# Patient Record
Sex: FEMALE | Race: OTHER | HISPANIC OR LATINO | URBAN - METROPOLITAN AREA
[De-identification: names, ages, dates, MRNs, and addresses within clinical notes are randomized per-mention and may not be internally consistent; named-entity substitution may affect disease eponyms.]

---

## 2022-01-07 ENCOUNTER — EMERGENCY (EMERGENCY)
Facility: HOSPITAL | Age: 37
LOS: 1 days | Discharge: HOME | End: 2022-01-07
Attending: STUDENT IN AN ORGANIZED HEALTH CARE EDUCATION/TRAINING PROGRAM
Payer: MEDICAID

## 2022-01-07 VITALS
DIASTOLIC BLOOD PRESSURE: 78 MMHG | TEMPERATURE: 98 F | SYSTOLIC BLOOD PRESSURE: 113 MMHG | OXYGEN SATURATION: 98 % | HEART RATE: 76 BPM | RESPIRATION RATE: 16 BRPM | WEIGHT: 191.8 LBS

## 2022-01-07 LAB
ALBUMIN SERPL ELPH-MCNC: 3.4 G/DL — LOW (ref 3.5–5)
ALP SERPL-CCNC: 103 U/L — SIGNIFICANT CHANGE UP (ref 40–120)
ALT FLD-CCNC: 15 U/L DA — SIGNIFICANT CHANGE UP (ref 10–60)
ANION GAP SERPL CALC-SCNC: 4 MMOL/L — LOW (ref 5–17)
AST SERPL-CCNC: 14 U/L — SIGNIFICANT CHANGE UP (ref 10–40)
BASOPHILS # BLD AUTO: 0.03 K/UL — SIGNIFICANT CHANGE UP (ref 0–0.2)
BASOPHILS NFR BLD AUTO: 0.4 % — SIGNIFICANT CHANGE UP (ref 0–2)
BILIRUB SERPL-MCNC: 0.3 MG/DL — SIGNIFICANT CHANGE UP (ref 0.2–1.2)
BUN SERPL-MCNC: 11 MG/DL — SIGNIFICANT CHANGE UP (ref 7–18)
CALCIUM SERPL-MCNC: 9 MG/DL — SIGNIFICANT CHANGE UP (ref 8.4–10.5)
CHLORIDE SERPL-SCNC: 108 MMOL/L — SIGNIFICANT CHANGE UP (ref 96–108)
CO2 SERPL-SCNC: 26 MMOL/L — SIGNIFICANT CHANGE UP (ref 22–31)
CREAT SERPL-MCNC: 0.58 MG/DL — SIGNIFICANT CHANGE UP (ref 0.5–1.3)
EOSINOPHIL # BLD AUTO: 0.14 K/UL — SIGNIFICANT CHANGE UP (ref 0–0.5)
EOSINOPHIL NFR BLD AUTO: 1.7 % — SIGNIFICANT CHANGE UP (ref 0–6)
GLUCOSE SERPL-MCNC: 90 MG/DL — SIGNIFICANT CHANGE UP (ref 70–99)
HCT VFR BLD CALC: 39.1 % — SIGNIFICANT CHANGE UP (ref 34.5–45)
HGB BLD-MCNC: 12.8 G/DL — SIGNIFICANT CHANGE UP (ref 11.5–15.5)
IMM GRANULOCYTES NFR BLD AUTO: 0.4 % — SIGNIFICANT CHANGE UP (ref 0–1.5)
LYMPHOCYTES # BLD AUTO: 1.77 K/UL — SIGNIFICANT CHANGE UP (ref 1–3.3)
LYMPHOCYTES # BLD AUTO: 21.8 % — SIGNIFICANT CHANGE UP (ref 13–44)
MAGNESIUM SERPL-MCNC: 2.4 MG/DL — SIGNIFICANT CHANGE UP (ref 1.6–2.6)
MCHC RBC-ENTMCNC: 29.1 PG — SIGNIFICANT CHANGE UP (ref 27–34)
MCHC RBC-ENTMCNC: 32.7 GM/DL — SIGNIFICANT CHANGE UP (ref 32–36)
MCV RBC AUTO: 88.9 FL — SIGNIFICANT CHANGE UP (ref 80–100)
MONOCYTES # BLD AUTO: 0.46 K/UL — SIGNIFICANT CHANGE UP (ref 0–0.9)
MONOCYTES NFR BLD AUTO: 5.7 % — SIGNIFICANT CHANGE UP (ref 2–14)
NEUTROPHILS # BLD AUTO: 5.7 K/UL — SIGNIFICANT CHANGE UP (ref 1.8–7.4)
NEUTROPHILS NFR BLD AUTO: 70 % — SIGNIFICANT CHANGE UP (ref 43–77)
NRBC # BLD: 0 /100 WBCS — SIGNIFICANT CHANGE UP (ref 0–0)
PLATELET # BLD AUTO: 296 K/UL — SIGNIFICANT CHANGE UP (ref 150–400)
POTASSIUM SERPL-MCNC: 4.2 MMOL/L — SIGNIFICANT CHANGE UP (ref 3.5–5.3)
POTASSIUM SERPL-SCNC: 4.2 MMOL/L — SIGNIFICANT CHANGE UP (ref 3.5–5.3)
PROT SERPL-MCNC: 7.7 G/DL — SIGNIFICANT CHANGE UP (ref 6–8.3)
RBC # BLD: 4.4 M/UL — SIGNIFICANT CHANGE UP (ref 3.8–5.2)
RBC # FLD: 14.1 % — SIGNIFICANT CHANGE UP (ref 10.3–14.5)
SODIUM SERPL-SCNC: 138 MMOL/L — SIGNIFICANT CHANGE UP (ref 135–145)
WBC # BLD: 8.13 K/UL — SIGNIFICANT CHANGE UP (ref 3.8–10.5)
WBC # FLD AUTO: 8.13 K/UL — SIGNIFICANT CHANGE UP (ref 3.8–10.5)

## 2022-01-07 PROCEDURE — 99284 EMERGENCY DEPT VISIT MOD MDM: CPT

## 2022-01-07 PROCEDURE — 36415 COLL VENOUS BLD VENIPUNCTURE: CPT

## 2022-01-07 PROCEDURE — 96374 THER/PROPH/DIAG INJ IV PUSH: CPT

## 2022-01-07 PROCEDURE — 83735 ASSAY OF MAGNESIUM: CPT

## 2022-01-07 PROCEDURE — 85025 COMPLETE CBC W/AUTO DIFF WBC: CPT

## 2022-01-07 PROCEDURE — 80053 COMPREHEN METABOLIC PANEL: CPT

## 2022-01-07 PROCEDURE — 99284 EMERGENCY DEPT VISIT MOD MDM: CPT | Mod: 25

## 2022-01-07 PROCEDURE — 96361 HYDRATE IV INFUSION ADD-ON: CPT

## 2022-01-07 RX ORDER — ACETAMINOPHEN 500 MG
975 TABLET ORAL ONCE
Refills: 0 | Status: COMPLETED | OUTPATIENT
Start: 2022-01-07 | End: 2022-01-07

## 2022-01-07 RX ORDER — KETOROLAC TROMETHAMINE 30 MG/ML
15 SYRINGE (ML) INJECTION ONCE
Refills: 0 | Status: DISCONTINUED | OUTPATIENT
Start: 2022-01-07 | End: 2022-01-07

## 2022-01-07 RX ORDER — SODIUM CHLORIDE 9 MG/ML
1000 INJECTION INTRAMUSCULAR; INTRAVENOUS; SUBCUTANEOUS ONCE
Refills: 0 | Status: COMPLETED | OUTPATIENT
Start: 2022-01-07 | End: 2022-01-07

## 2022-01-07 RX ADMIN — Medication 15 MILLIGRAM(S): at 14:02

## 2022-01-07 RX ADMIN — Medication 975 MILLIGRAM(S): at 14:02

## 2022-01-07 RX ADMIN — SODIUM CHLORIDE 1000 MILLILITER(S): 9 INJECTION INTRAMUSCULAR; INTRAVENOUS; SUBCUTANEOUS at 15:02

## 2022-01-07 RX ADMIN — Medication 975 MILLIGRAM(S): at 15:02

## 2022-01-07 RX ADMIN — SODIUM CHLORIDE 2000 MILLILITER(S): 9 INJECTION INTRAMUSCULAR; INTRAVENOUS; SUBCUTANEOUS at 14:02

## 2022-01-07 RX ADMIN — Medication 15 MILLIGRAM(S): at 15:02

## 2022-01-07 NOTE — ED PROVIDER NOTE - NSFOLLOWUPINSTRUCTIONS_ED_ALL_ED_FT
You were seen in the emergency department for: buttock pain  Your labs were normal.  Your pain is likely muscular, but it might be a nerve pain.    You may be contacted by the Emergency Department Referrals Coordinator to set up your follow-up appointment within 24-48 hours of your discharge, Monday to Friday. We recommend you follow up with: your primary care doctor    Please return to the Emergency Department if you experience any of the following symptoms:   - Shortness of breath or trouble breathing  - Pressure, pain or tightness in the chest  - Face drooping, arm weakness or speech difficulty  - Persistence of severe vomiting  - Head injury or loss of consciousness  - Nonstop bleeding or an open wound    (1) Follow up with your primary care physician within the next 24-48 hours as discussed. In addition, we did not find evidence of a life threatening illness on your testing here today, but listed below are the specialists that will be necessary to see as an outpatient to continue the workup.  Please call the numbers listed below or [phone] to set up the necessary appointments.  (2) Take Tylenol (up to 1000mg or 1 g)  and/or Motrin (up to 600mg) up to every 6 hours as needed for pain.   (3) If you had an IV (intravenous) line placed, it was removed. Sometimes, after IV removal, that area can be tender for a few days; if it develops redness and swelling, those could be signs of infection; in which case, return to the Emergency Department for assessment.  (4) Please continue taking all of your home medications as directed. Lo vieron en el departamento de emergencias por: dolor en los glúteos  Tus laboratorios fueron normales.  Es probable que santana dolor sea muscular, lisa podría ser un dolor nervioso.    El Coordinador de Referencias del Departamento de Emergencias puede comunicarse con usted para programar santana toro de seguimiento dentro de las 24 a 48 horas posteriores a santana chris, de lunes a viernes. Le recomendamos que radha un seguimiento con: santana médico de atención primaria    Regrese al Departamento de Emergencias si experimenta alguno de los siguientes síntomas:  - Dificultad para respirar o dificultad para respirar  - Presión, dolor u opresión en el pecho  - Susan caída, debilidad en los brazos o dificultad para hablar  - Persistencia de vómitos severos  - Lesión en la rigo o pérdida del conocimiento  - Sangrado continuo o shannon herida abierta    (1) Radha un seguimiento con santana médico de atención primaria dentro de las próximas 24 a 48 horas, según lo discutido. Además, no encontramos evidencia de shannon enfermedad potencialmente mortal en deolnte pruebas aquí hoy, lisa a continuación se enumeran los especialistas que será necesario jorge a louisa paciente ambulatorio para continuar con el estudio. Llame a los números que se indican a continuación o al [phone] para programar las citas necesarias.  (2) [place] Tylenol (hasta 1000 mg o 1 g) y/o Motrin (hasta 600 mg) hasta cada 6 horas según sea necesario para el dolor.  (3) Si le colocaron shannon línea IV (intravenosa), se la quitaron. A veces, después de la extracción de la vía intravenosa, gualberto área puede estar sensible sharan unos días; si desarrolla enrojecimiento e hinchazón, podrían ser signos de infección; en cuyo corinne, regrese al Departamento de Emergencias para santana evaluación.  (4) Continúe tomando todos delonte medicamentos en el hogar según las indicaciones.    You were seen in the emergency department for: buttock pain  Your labs were normal.  Your pain is likely muscular, but it might be a nerve pain.    You may be contacted by the Emergency Department Referrals Coordinator to set up your follow-up appointment within 24-48 hours of your discharge, Monday to Friday. We recommend you follow up with: your primary care doctor    Please return to the Emergency Department if you experience any of the following symptoms:   - Shortness of breath or trouble breathing  - Pressure, pain or tightness in the chest  - Face drooping, arm weakness or speech difficulty  - Persistence of severe vomiting  - Head injury or loss of consciousness  - Nonstop bleeding or an open wound    (1) Follow up with your primary care physician within the next 24-48 hours as discussed. In addition, we did not find evidence of a life threatening illness on your testing here today, but listed below are the specialists that will be necessary to see as an outpatient to continue the workup.  Please call the numbers listed below or [phone] to set up the necessary appointments.  (2) Take Tylenol (up to 1000mg or 1 g)  and/or Motrin (up to 600mg) up to every 6 hours as needed for pain.   (3) If you had an IV (intravenous) line placed, it was removed. Sometimes, after IV removal, that area can be tender for a few days; if it develops redness and swelling, those could be signs of infection; in which case, return to the Emergency Department for assessment.  (4) Please continue taking all of your home medications as directed.

## 2022-01-07 NOTE — ED PROVIDER NOTE - PATIENT PORTAL LINK FT
You can access the FollowMyHealth Patient Portal offered by [facility] by registering at the following website: [email or web address]. By joining [employer]’s FollowMyHealth portal, you will also be able to view your health information using other applications (apps) compatible with our system.

## 2022-01-07 NOTE — ED PROVIDER NOTE - CLINICAL SUMMARY MEDICAL DECISION MAKING FREE TEXT BOX
36-year-old female no medical hx presenting with L buttock/thigh pain x 3 days. Will check basic labs given complaint of intermittent tingling and provide analgesia. Anticipate discharge with PMD followup.

## 2022-01-07 NOTE — ED PROVIDER NOTE - OBJECTIVE STATEMENT
36-year-old female no medical hx presenting with L buttock/thigh pain x 3 days. No inciting factors, no trauma. Notes she's had similar symptoms for months episodically but this episode is worse. Has also had intermittent tingling/burning over her whole body for the past year or so. Denies any other symptoms.

## 2022-01-07 NOTE — ED PROVIDER NOTE - MUSCULOSKELETAL, MLM
Spine appears normal, range of motion is not limited. Tenderness to light palpation over L buttock/thigh.

## 2022-01-07 NOTE — ED PROVIDER NOTE - DISCHARGE DATE
[facility]  Office: [phone]  Ericepi Nolberto Blood DO, Jenny Rose MD, Jay Adames MD, Willard Landa MD, Alice Reid MD, Ruddy Graham MD, Cole Perea MD, Pete Kim MD, Sabrina Francois MD, Kayla Esteves MD, Verenice Jani DO, Azeem Craig MD, Arian Madsen MD, Mildred Meneses DO, Amy Wharton MD,  Vincenzo Ramsey MD, Venancio Capone MD, [facility], [place] CNP, Johnson Ferrelle CNP, Etienne Rogers CNS, Aiden Root CNP, Kirstie Britt CNP, Baron Lesch CNP, Palmira Gallego CNP, Iveth Vang CNP, Alcira Nayak PA-C, [place] Parks CNP, Ernesto Santamaria CNP, Kevin Thorne CNP, Natalie Highlands CNP, Imani Mcintosh CNP, [place] [address]      Discharge Summary     Patient ID: Hans Walters  :     MRN: [id number]     ACCOUNT:  [de-identified]   Patient Location :   Patient's PCP: Vicente Krishnamurthy MD  Admit Date: 11/15/2021   Discharge Date: 2021     Length of Stay: 3  Code Status:  Prior  Admitting Physician: Willard Landa MD  Discharge Physician: Willard Landa MD     Active Discharge Diagnosis :     Primary Problem  Gastrointestinal hemorrhage with hematemesis      [facility] Problems    Diagnosis Date Noted    Gastrointestinal hemorrhage [K92.2]     Blood loss anemia [D50.0]     Gastrointestinal hemorrhage with hematemesis [K92.0] 11/15/2021    Gastropathy associated with nonsteroidal anti-inflammatory drug (NSAID) [K31.9, T39.395A] 11/15/2021    Post herpetic neuralgia [B02.29] 11/15/2021       Admission Condition:  fair     Discharged Condition: stable    Hospital Stay:     Hospital Course:  Hans Walters is a 69 y.o. female who was admitted for the management of   Gastrointestinal hemorrhage with hematemesis , presented to ER with Emesis (this morning, coffee ground)  Patient presented to emergency room with coffee ground emesis associated with nausea, lightheadedness. Patient also reported loose stool, abdominal cramps, lightheadedness and passed out in the bathroom. Patient has been taking diclofenac 50 mg 3 times daily and tramadol 50 mg twice daily for arthritis. Work-up in ED showed tachycardia 90s to 100, SBP 90s, hemoglobin 9.1. CT abdomen pelvis showed asymmetric nodularity mucosal hyperenhancement along the posterior wall of pylorus concerning for peptic ulcer disease with 8 mm left renal cyst.   Patient received red blood cell transfusion for anemia of acute blood loss. EGD showed duodenal ulcer likely secondary to NSAIDs. Significant therapeutic interventions:   Upper GI bleed due to NSAID induced peptic ulcer  -Protonix twice daily. Avoid NSAIDs. Follow-up outpatient with GI for biopsy results. Anemia of acute blood loss - s/p 2 unit transfusion. Iron supplement. Postherpetic neuralgia-tramadol, gabapentin 80 mg 3 times daily.       Significant Diagnostic Studies:   Labs / Micro:/Radiology  Recent Labs     11/18/21  1237 11/18/21  0720 11/18/21  0016 11/15/21  2010 11/15/21  0858   WBC  --   --   --   --  6.7   HGB 10.0* 8.8* 6.7*   < > 9.1*   HCT 31.0* 27.5* 20.8*   < > 29.2*   MCV  --   --   --   --  99.3   PLT  --   --   --   --  173    < > = values in this interval not displayed.      Labs Renal Latest Ref Rng & Units 11/18/2021 11/17/2021 11/16/2021 11/15/2021 9/15/2020   BUN 8 - 23 mg/dL 16 24(H) 45(H) 65(H) -   Cr 0.50 - 0.90 mg/dL 0.81 0.72 0.79 0.77 -   K 3.7 - 5.3 mmol/L 4.0 3.7 4.2 4.6 -   Na 135 - 144 mmol/L 140 141 143 141 141     Lab Results   Component Value Date    ALT 16 11/15/2021    AST 20 11/15/2021    ALKPHOS 51 11/15/2021    BILITOT 0.27 (L) 11/15/2021     Lab Results   Component Value Date    TSH 2.88 09/15/2020    TSH 2.88 09/15/2020     Lab Results   Component Value Date    HEPCAB NONREACTIVE 08/05/2021     Lab Results   Component Value Date    COLORU Yellow 11/15/2021    NITRU NEGATIVE 11/15/2021    GLUCOSEU NEGATIVE 11/15/2021    KETUA TRACE 11/15/2021    UROBILINOGEN Normal 11/15/2021    BILIRUBINUR NEGATIVE 11/15/2021    BILIRUBINUR negative 10/13/2018     No results found for: LABA1C  No results found for: EAG  Lab Results   Component Value Date    INR 1.1 11/15/2021    PROTIME 14.4 (H) 11/15/2021       CT ABDOMEN PELVIS W IV CONTRAST Additional Contrast? None    Result Date: 11/15/2021  1. There focal asymmetric a nodularity mucosal hyperenhancement along posterior wall the pylorus raising concern for peptic ulcer disease or other pathology. Endoscopic correlation advised 2. 8 mm left renal cysts and additional tiny hypodensities too small to characterize. Consultations:    Consults:     Final Specialist Recommendations/Findings:   IP CONSULT TO HOSPITALIST  IP CONSULT TO GI      The patient was seen and examined on day of discharge and this discharge summary is in conjunction with any daily progress note from day of discharge. Discharge plan:     Disposition: Home with home care     Physician Follow Up:     [address] [address]  [address] [place] [address]    In 7 days  Influenza vaccination due     Kailey Keenan MD  [address]  [phone]    Schedule an appointment as soon as possible for a visit  outpatient colonoscopy       Requiring Further Evaluation/Follow Up POST HOSPITALIZATION/Incidental Findings: Medication as advised. Follow-up with gastroenterology for biopsy results. Diet: regular diet    Activity: As tolerated. Instructions to Patient: Medication as advised.     Discharge Medications:      Medication List      START taking these medications    ferrous sulfate 325 (65 Fe) MG tablet  Commonly known as: IRON 325  Take 1 tablet by mouth 2 times daily pantoprazole 40 MG tablet  Commonly known as: PROTONIX  Take 1 tablet by mouth 2 times daily (before meals)     traMADol 50 MG tablet  Commonly known as: ULTRAM  TAKE ONE TABLET BY MOUTH TWICE A DAY        CONTINUE taking these medications    * acidophilus Caps capsule     * Probiotic Acidophilus Tabs  Take 1 tablet by mouth daily     calcium carbonate 600 MG Tabs tablet  TAKE ONE TABLET BY MOUTH TWICE A DAY WITH VITAMIN D3 500 MG. Calcium Plus Vitamin D3 600-500 MG-UNIT Caps  Generic drug: Calcium Carb-Cholecalciferol  TAKE 1 CAPSULE BY MOUTH TWICE A DAY     cimetidine 800 MG tablet  Commonly known as: TAGAMET  Take 1 tablet by mouth 2 times daily     gabapentin 800 MG tablet  Commonly known as: NEURONTIN  Take 1 tablet by mouth 3 times daily for 30 days. melatonin 3 MG Tabs tablet  Take 1 tablet by mouth daily     * naloxone 4 MG/0.1ML Liqd nasal spray  1 spray by Nasal route as needed for Opioid Reversal     * naloxone 4 MG/0.1ML Liqd nasal spray  1 spray by Nasal route as needed for Opioid Reversal     * naloxone 4 MG/0.1ML Liqd nasal spray  1 spray by Nasal route as needed for Opioid Reversal     sertraline 25 MG tablet  Commonly known as: ZOLOFT  TAKE ONE TABLET BY MOUTH DAILY     tiZANidine 2 MG tablet  Commonly known as: ZANAFLEX  TAKE ONE TABLET BY MOUTH EVERY 8 HOURS AS NEEDED     traZODone 50 MG tablet  Commonly known as: DESYREL  Take 1 tablet by mouth nightly     zoster recombinant adjuvanted vaccine 50 MCG/0.5ML Susr injection  Commonly known as: SHINGRIX  Inject 0.5 mLs into the muscle See Admin Instructions 1 dose now and repeat in 2-6 months         * This list has 5 medication(s) that are the same as other medications prescribed for you. Read the directions carefully, and ask your doctor or other care provider to review them with you.             STOP taking these medications    aspirin 81 MG EC tablet  Commonly known as: Aspirin Adult Low Strength     diclofenac 50 MG EC tablet  Commonly known as: ELIZABETH           Where to Get Your Medications      These medications were sent to [facility] [address], [address] [facility]  [address] [facility], [address] [place] [address]    Phone: [phone]   traMADol 50 MG tablet     These medications were sent to [address], [address] [facility] [phone] - [phone]  [address] [place] [address], [place] [address]    Phone: [phone]   ferrous sulfate 325 (65 Fe) MG tablet  pantoprazole 40 MG tablet         Time Spent on discharge is  32 mins in patient examination, evaluation, counseling as well as medication reconciliation, prescriptions for required medications, discharge plan and follow up. Electronically signed by   Pina Franco MD  11/18/2021        Thank you Dr. Isidoro Brower MD for the opportunity to be involved in this patient's care. 07-Jan-2022

## 2022-01-07 NOTE — ED PROVIDER NOTE - NSFOLLOWUPCLINICS_GEN_ALL_ED_FT
[facility] - Primary Care  Primary Care  [address] [place] [address]  Phone: [phone]  Fax:     [facility]  Orthopedics  [address] [place] [address]  Phone: [phone]  Fax: [phone]

## 2022-08-17 ENCOUNTER — EMERGENCY (EMERGENCY)
Facility: HOSPITAL | Age: 37
LOS: 1 days | Discharge: HOME | End: 2022-08-17
Attending: EMERGENCY MEDICINE
Payer: MEDICAID

## 2022-08-17 VITALS
WEIGHT: 188.05 LBS | RESPIRATION RATE: 19 BRPM | OXYGEN SATURATION: 99 % | HEART RATE: 92 BPM | SYSTOLIC BLOOD PRESSURE: 116 MMHG | TEMPERATURE: 98 F | DIASTOLIC BLOOD PRESSURE: 82 MMHG | HEIGHT: 63.39 IN

## 2022-08-17 VITALS — SYSTOLIC BLOOD PRESSURE: 104 MMHG | HEART RATE: 63 BPM | DIASTOLIC BLOOD PRESSURE: 74 MMHG

## 2022-08-17 PROCEDURE — 70450 CT HEAD/BRAIN W/O DYE: CPT | Mod: MA

## 2022-08-17 PROCEDURE — 96374 THER/PROPH/DIAG INJ IV PUSH: CPT

## 2022-08-17 PROCEDURE — 70450 CT HEAD/BRAIN W/O DYE: CPT | Mod: 26,MA

## 2022-08-17 PROCEDURE — 72125 CT NECK SPINE W/O DYE: CPT | Mod: MA

## 2022-08-17 PROCEDURE — 99284 EMERGENCY DEPT VISIT MOD MDM: CPT

## 2022-08-17 PROCEDURE — 99284 EMERGENCY DEPT VISIT MOD MDM: CPT | Mod: 25

## 2022-08-17 PROCEDURE — 82962 GLUCOSE BLOOD TEST: CPT

## 2022-08-17 PROCEDURE — 72125 CT NECK SPINE W/O DYE: CPT | Mod: 26,MA

## 2022-08-17 PROCEDURE — 96375 TX/PRO/DX INJ NEW DRUG ADDON: CPT

## 2022-08-17 RX ORDER — METOCLOPRAMIDE HCL 10 MG
10 TABLET ORAL ONCE
Refills: 0 | Status: COMPLETED | OUTPATIENT
Start: 2022-08-17 | End: 2022-08-17

## 2022-08-17 RX ORDER — IBUPROFEN 200 MG
600 TABLET ORAL ONCE
Refills: 0 | Status: COMPLETED | OUTPATIENT
Start: 2022-08-17 | End: 2022-08-17

## 2022-08-17 RX ORDER — DIPHENHYDRAMINE HCL 50 MG
25 CAPSULE ORAL ONCE
Refills: 0 | Status: COMPLETED | OUTPATIENT
Start: 2022-08-17 | End: 2022-08-17

## 2022-08-17 RX ORDER — SODIUM CHLORIDE 9 MG/ML
1000 INJECTION INTRAMUSCULAR; INTRAVENOUS; SUBCUTANEOUS ONCE
Refills: 0 | Status: COMPLETED | OUTPATIENT
Start: 2022-08-17 | End: 2022-08-17

## 2022-08-17 RX ADMIN — Medication 104 MILLIGRAM(S): at 09:36

## 2022-08-17 RX ADMIN — SODIUM CHLORIDE 1000 MILLILITER(S): 9 INJECTION INTRAMUSCULAR; INTRAVENOUS; SUBCUTANEOUS at 09:35

## 2022-08-17 RX ADMIN — Medication 10 MILLIGRAM(S): at 10:04

## 2022-08-17 RX ADMIN — Medication 25 MILLIGRAM(S): at 09:36

## 2022-08-17 RX ADMIN — Medication 600 MILLIGRAM(S): at 11:39

## 2022-08-17 NOTE — ED PROVIDER NOTE - NSFOLLOWUPINSTRUCTIONS_ED_ALL_ED_FT
Cefalea migrañosa    Migraine Headache      Shannon cefalea migrañosa es un dolor intenso y punzante en tha o ambos lados de la rigo. Las cefaleas migrañosas también pueden causar otros síntomas, louisa náuseas, vómitos y sensibilidad a la rosales y el ruido. Shannon cefalea migrañosa puede durar desde 4 horas hasta 3 días. Hable con santana médico sobre los factores que pueden causar (desencadenar) las cefaleas migrañosas.      ¿Cuáles son las causas?    Se desconoce la causa exacta de esta afección. Sin embargo, shannon migraña puede aparecer cuando los nervios del cerebro se irritan y liberan ciertas sustancias químicas que causan inflamación de los vasos sanguíneos. Esta inflamación provoca dolor. Esta afección puede desencadenarse o ser causada por lo siguiente:  •Consumo de alcohol.      •Consumo de cigarrillos.    •Paulie medicamentos louisa por ejemplo:  •Medicamentos para aliviar el dolor torácico (nitroglicerina).      •Anticonceptivos orales.      •Estrógeno.      •Ciertos medicamentos para la presión arterial.        •Gove o beber productos que contienen nitratos, glutamato, aspartamo o tiramina. Los quesos añejados, el chocolate o la cafeína también pueden ser desencadenantes.      •Hacer actividad física.      Otros factores que pueden provocar cefalea migrañosa son los siguientes:  •Menstruación.      •Embarazo.      •Hambre.      •Estrés.      •Dormir poco o dormir demasiado.      •Cambios climáticos.      •Fatiga.        ¿Qué incrementa el riesgo?    Los siguientes factores pueden hacer que usted sea más propenso a tener migrañas:  •Tener cierta edad. Es más probable que esta afección se manifieste en personas que tienen entre 25 y 55 años.      •Ser diana.      •Tener antecedentes familiares de cefalea migrañosa.      •Ser de nathalie caucásica.      •Tener shannon afección de sukhi mental, louisa depresión o ansiedad.      •Ser raymundo.        ¿Cuáles son los signos o los síntomas?    El principal síntoma de esta afección es el dolor intenso y punzante. Lara dolor puede tener las siguientes características:  •Puede aparecer en cualquier región de la rigo, tanto de un lado louisa de ambos.      •Puede interferir con las actividades de la melody cotidiana.      •Puede empeorar con la actividad física.      •Puede empeorar ante la exposición a luces brillantes o a ruidos eliseo.      Otros síntomas pueden incluir:  •Náuseas.      •Vómitos.      •Mareos.      •Sensibilidad general a las luces brillantes, a los ruidos eliseo o a los olores.      Antes de tener shannon cefalea migrañosa, puede recibir señales de advertencia (aura). Un aura puede incluir:  •Donita luces intermitentes o tener puntos ciegos.      •Donita puntos brillantes, halos o líneas en zigzag.      •Tener shannon visión en túnel o visión borrosa.      •Sentir entumecimiento u hormigueo.      •Tener dificultad para hablar.      •Debilidad muscular.      Algunas personas tienen síntomas después de shannon cefalea migrañosa (fase posdromal), louisa los siguientes:  •Cansancio.      •Dificultad para concentrarte.        ¿Cómo se diagnostica?    La cefalea migrañosa se diagnostica en función de lo siguiente:  •Gisela síntomas.      •Un examen físico.    •Pruebas, louisa, por ejemplo:   •Tomografía computarizada (TC) o resonancia magnética (RM) de la rigo. Estos estudios de diagnóstico por imágenes pueden ayudar a descartar otras causas de cefalea.      •Paulie shannon muestra de líquido de la médula alvarado (punción lumbar) para analizar (análisis de líquido cefalorraquídeo o análisis de LCR).          ¿Cómo se trata?    Esta afección se puede tratar con medicamentos para:  •Aliviar el dolor.      •Aliviar las náuseas.      •Prevenir las cefaleas migrañosas.      El tratamiento de esta afección también puede incluir lo siguiente:  •Acupuntura.      •Cambios en el estilo de melody, louisa evitar los alimentos que provocan las cefaleas migrañosas.      •Biorretroalimentación.      •Terapia cognitiva conductual.        Siga estas instrucciones en santana casa:    Medicamentos     •Use los medicamentos de venta prachi y los recetados solamente louisa se lo haya indicado el médico.    •Pregúntele al médico si el medicamento recetado:  •Hace que sea necesario que evite conducir o usar maquinaria pesada.    •Puede causarle estreñimiento. Es posible que tenga que paulie estas medidas para prevenir o tratar el estreñimiento:  •Beber suficiente líquido louisa para mantener la orina de color amarillo pálido.      •Paulie medicamentos recetados o de venta prachi.      •Consumir alimentos ricos en fibra, louisa frijoles, cereales integrales, y frutas y verduras frescas.      •Limitar el consumo de alimentos ricos en grasa y azúcares procesados, louisa los alimentos fritos o dulces.          Estilo de melody     • No mervin alcohol.      • No consuma ningún producto que contenga nicotina o tabaco, louisa cigarrillos, cigarrillos electrónicos y tabaco de mascar. Si necesita ayuda para dejar de fumar, consulte al médico.      •Duerma louisa mínimo 8 horas todas las noches.      •Encuentre modos de manejar el estrés, por ejemplo, a través de la meditación, la respiración profunda o el yoga.        Indicaciones generales                 •Lleve un registro diario para averiguar lo que puede provocar las cefaleas migrañosas. Registre, por ejemplo, lo siguiente:  •Lo que usted come y leonardo.      •El tiempo que duerme.      •Algún cambio en santana dieta o en los medicamentos.      •Si tiene shannon cefalea migrañosa:  •Evite los factores que empeoren los síntomas, louisa las luces brillantes.      •Resulta útil acostarse en shannon habitación oscura y silenciosa.      •No conduzca vehículos ni opere maquinaria pesada.      •Pregúntele al médico qué actividades son seguras para usted cuando tiene síntomas.        •Concurra a todas las visitas de seguimiento louisa se lo haya indicado el médico. [place] es importante.        Comuníquese con un médico si:    •Tiene síntomas de cefalea migrañosa que son distintos o más intensos que los habituales.      •Tiene más de 15 días de cefalea por mes.        Solicite ayuda inmediatamente si:    •La cefalea migrañosa se vuelve cada vez más intensa.      •La cefalea migrañosa dura más de 72 horas.      •Tiene fiebre.      •Presenta rigidez en el merari.      •Presenta pérdida de la visión.      •Siente debilidad en los músculos o que no puede controlarlos.      •Comienza a perder el equilibrio con frecuencia.      •Presenta dificultad para caminar.      •Se desmaya.      •Tiene shannon convulsión.        Resumen    •Shannon cefalea migrañosa es un dolor intenso y punzante en tha o ambos lados de la rigo. Las migrañas también pueden causar otros síntomas, louisa náuseas, vómitos y sensibilidad a la rosales y el ruido.      •Esta afección puede tratarse con medicamentos y cambios en el estilo de melody. También es posible que deba evitar ciertos factores que desencadenan shannon cefalea migrañosa.      •Lleve un registro diario para averiguar lo que puede provocar las cefaleas migrañosas.      •Comuníquese con el médico si tiene más de 15 días de cefalea en un mes o presenta síntomas de cefalea migrañosa que son distintos o más intensos que los habituales.      Esta información no tiene louisa fin reemplazar el consejo del médico. Asegúrese de hacerle al médico cualquier pregunta que tenga.

## 2022-08-17 NOTE — ED PROVIDER NOTE - PHYSICAL EXAMINATION
left facial paralysis sparing upper face. awake alert. tenderness to palpation to left 3rd and 4th fingers. midline cervical spinal tenderness to palpation. supple neck, awake alert

## 2022-08-17 NOTE — ED PROVIDER NOTE - OBJECTIVE STATEMENT
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 took multiple medication without relief. Notes nausea and photophobia. also notes pain and swelling in 3rd and 4th fingers since yesterday, feels like electricity shooting down from the neck. no trauma.

## 2022-08-17 NOTE — ED PROVIDER NOTE - PATIENT PORTAL LINK FT
You can access the FollowMyHealth Patient Portal offered by [facility] by registering at the following website: [email or web address]. By joining [employer]’s FollowMyHealth portal, you will also be able to view your health information using other applications (apps) compatible with our system.

## 2022-08-17 NOTE — ED ADULT TRIAGE NOTE - CHIEF COMPLAINT QUOTE
c/o headache x 1 week history migraines yesterday swelling to 2 fingers on left hand and pain with minimal movement , history of paralysis 20 years ago